# Patient Record
Sex: FEMALE | Race: WHITE | NOT HISPANIC OR LATINO | Employment: FULL TIME | URBAN - METROPOLITAN AREA
[De-identification: names, ages, dates, MRNs, and addresses within clinical notes are randomized per-mention and may not be internally consistent; named-entity substitution may affect disease eponyms.]

---

## 2017-02-24 ENCOUNTER — HOSPITAL ENCOUNTER (OUTPATIENT)
Dept: MAMMOGRAPHY | Facility: HOSPITAL | Age: 52
Discharge: HOME | End: 2017-02-24
Attending: OBSTETRICS & GYNECOLOGY | Admitting: OBSTETRICS & GYNECOLOGY

## 2018-04-17 ENCOUNTER — HOSPITAL ENCOUNTER (OUTPATIENT)
Dept: MAMMOGRAPHY | Facility: HOSPITAL | Age: 53
Discharge: HOME | End: 2018-04-17
Attending: OBSTETRICS & GYNECOLOGY | Admitting: OBSTETRICS & GYNECOLOGY

## 2018-05-16 ENCOUNTER — HOSPITAL ENCOUNTER (OUTPATIENT)
Dept: MAMMOGRAPHY | Facility: HOSPITAL | Age: 53
Discharge: HOME | End: 2018-05-16
Attending: OBSTETRICS & GYNECOLOGY | Admitting: OBSTETRICS & GYNECOLOGY

## 2019-04-25 ENCOUNTER — HOSPITAL ENCOUNTER (OUTPATIENT)
Dept: MAMMOGRAPHY | Facility: HOSPITAL | Age: 54
Discharge: HOME | End: 2019-04-25
Attending: OBSTETRICS & GYNECOLOGY | Admitting: OBSTETRICS & GYNECOLOGY

## 2019-11-06 ENCOUNTER — PATIENT MESSAGE (OUTPATIENT)
Dept: PODIATRY | Facility: CLINIC | Age: 54
End: 2019-11-06

## 2020-10-23 PROCEDURE — 87077 CULTURE AEROBIC IDENTIFY: CPT

## 2020-10-23 PROCEDURE — 87086 URINE CULTURE/COLONY COUNT: CPT

## 2020-10-23 PROCEDURE — 87186 SC STD MICRODIL/AGAR DIL: CPT

## 2021-03-31 PROBLEM — M54.50 LOW BACK PAIN: Status: ACTIVE | Noted: 2017-02-19

## 2021-03-31 PROBLEM — M19.90 OSTEOARTHRITIS: Status: ACTIVE | Noted: 2021-01-01

## 2021-03-31 PROBLEM — N39.0 UTI (URINARY TRACT INFECTION): Status: ACTIVE | Noted: 2018-11-14

## 2021-03-31 PROBLEM — Z83.3 FAMILY HISTORY OF DIABETES MELLITUS: Status: ACTIVE | Noted: 2021-03-31

## 2021-03-31 PROCEDURE — 87086 URINE CULTURE/COLONY COUNT: CPT | Performed by: FAMILY MEDICINE

## 2021-03-31 PROCEDURE — 87077 CULTURE AEROBIC IDENTIFY: CPT | Performed by: FAMILY MEDICINE

## 2021-03-31 PROCEDURE — 87186 SC STD MICRODIL/AGAR DIL: CPT | Performed by: FAMILY MEDICINE

## 2023-01-19 ENCOUNTER — TRANSCRIBE ORDERS (OUTPATIENT)
Dept: ADMINISTRATIVE | Facility: HOSPITAL | Age: 58
End: 2023-01-19
Payer: COMMERCIAL

## 2023-01-19 DIAGNOSIS — Z13.6 SCREENING FOR CARDIOVASCULAR CONDITION: Primary | ICD-10-CM

## 2023-04-28 ENCOUNTER — HOSPITAL ENCOUNTER (OUTPATIENT)
Dept: CARDIOLOGY | Facility: HOSPITAL | Age: 58
Discharge: HOME | End: 2023-04-28

## 2023-04-28 ENCOUNTER — HOSPITAL ENCOUNTER (OUTPATIENT)
Dept: CT IMAGING | Facility: HOSPITAL | Age: 58
Discharge: HOME | End: 2023-04-28

## 2023-04-28 DIAGNOSIS — Z13.6 SCREENING FOR CARDIOVASCULAR CONDITION: ICD-10-CM

## 2023-04-28 LAB
BH CV XLRA MEAS - PAD LEFT ABI PT: 1.15
BH CV XLRA MEAS - PAD LEFT ARM: 142 MMHG
BH CV XLRA MEAS - PAD LEFT LEG PT: 181 MMHG
BH CV XLRA MEAS - PAD RIGHT ABI PT: 1.13
BH CV XLRA MEAS - PAD RIGHT ARM: 157 MMHG
BH CV XLRA MEAS - PAD RIGHT LEG PT: 177 MMHG
BH CV XLRA MEAS - PROX AO DIAM: 1.7 CM
BH CV XLRA MEAS LEFT DIST CCA EDV: -26.3 CM/SEC
BH CV XLRA MEAS LEFT DIST CCA PSV: -83.4 CM/SEC
BH CV XLRA MEAS LEFT ICA/CCA RATIO: 1.1
BH CV XLRA MEAS LEFT MID CCA PSV: 83 CM/SEC
BH CV XLRA MEAS LEFT MID ICA PSV: 89 CM/SEC
BH CV XLRA MEAS LEFT PROX ICA EDV: -27.4 CM/SEC
BH CV XLRA MEAS LEFT PROX ICA PSV: -88.9 CM/SEC
BH CV XLRA MEAS RIGHT DIST CCA EDV: -21.4 CM/SEC
BH CV XLRA MEAS RIGHT DIST CCA PSV: -76.3 CM/SEC
BH CV XLRA MEAS RIGHT ICA/CCA RATIO: 1.1
BH CV XLRA MEAS RIGHT MID CCA PSV: 76 CM/SEC
BH CV XLRA MEAS RIGHT MID ICA PSV: 82 CM/SEC
BH CV XLRA MEAS RIGHT PROX ICA EDV: -31.8 CM/SEC
BH CV XLRA MEAS RIGHT PROX ICA PSV: -82.3 CM/SEC
MAXIMAL PREDICTED HEART RATE: 161 BPM
STRESS TARGET HR: 137 BPM

## 2023-04-28 PROCEDURE — 93799 UNLISTED CV SVC/PROCEDURE: CPT

## 2023-04-28 PROCEDURE — 75571 CT HRT W/O DYE W/CA TEST: CPT

## 2024-01-15 ENCOUNTER — TRANSCRIBE ORDERS (OUTPATIENT)
Dept: ADMINISTRATIVE | Facility: HOSPITAL | Age: 59
End: 2024-01-15
Payer: COMMERCIAL

## 2024-01-15 DIAGNOSIS — M81.0 AGE RELATED OSTEOPOROSIS, UNSPECIFIED PATHOLOGICAL FRACTURE PRESENCE: Primary | ICD-10-CM

## 2024-07-23 ENCOUNTER — TRANSCRIBE ORDERS (OUTPATIENT)
Dept: ADMINISTRATIVE | Facility: HOSPITAL | Age: 59
End: 2024-07-23
Payer: COMMERCIAL

## 2024-07-23 DIAGNOSIS — Z12.31 VISIT FOR SCREENING MAMMOGRAM: Primary | ICD-10-CM

## 2024-08-09 ENCOUNTER — HOSPITAL ENCOUNTER (OUTPATIENT)
Dept: MAMMOGRAPHY | Facility: HOSPITAL | Age: 59
Discharge: HOME | End: 2024-08-09
Admitting: OBSTETRICS & GYNECOLOGY
Payer: OTHER GOVERNMENT

## 2024-08-09 DIAGNOSIS — Z12.31 VISIT FOR SCREENING MAMMOGRAM: ICD-10-CM

## 2024-08-09 PROCEDURE — 77067 SCR MAMMO BI INCL CAD: CPT

## 2024-08-09 PROCEDURE — 77063 BREAST TOMOSYNTHESIS BI: CPT

## 2024-09-19 ENCOUNTER — E-VISIT (OUTPATIENT)
Dept: FAMILY MEDICINE CLINIC | Facility: TELEHEALTH | Age: 59
End: 2024-09-19

## 2024-12-16 ENCOUNTER — HOSPITAL ENCOUNTER (OUTPATIENT)
Dept: BONE DENSITY | Facility: HOSPITAL | Age: 59
Discharge: HOME | End: 2024-12-16
Admitting: NURSE PRACTITIONER
Payer: OTHER GOVERNMENT

## 2024-12-16 DIAGNOSIS — M81.0 AGE RELATED OSTEOPOROSIS, UNSPECIFIED PATHOLOGICAL FRACTURE PRESENCE: ICD-10-CM

## 2024-12-16 PROCEDURE — 77080 DXA BONE DENSITY AXIAL: CPT

## 2025-07-06 ENCOUNTER — E-VISIT (OUTPATIENT)
Dept: ADMINISTRATIVE | Facility: OTHER | Age: 60
End: 2025-07-06
Payer: OTHER GOVERNMENT

## 2025-07-06 NOTE — E-VISIT ESCALATED
Status: Referred Out  Date: 2025 16:52:51  Acuity Level: Within 3 days  Referral message:  We're sorry you are not feeling well. Your safety is important to us. Sudden back pain in those over the age of 50 could indicate a problem with your spine. Lab tests and imaging may be required to rule out a more serious condition and   find the most effective treatment for you. For this reason, we would like for you to be seen in person.   For the most appropriate care, please be seen:   At a clinic or an urgent care  Within 3 days   You won't be charged for this visit.We hope you   feel better soon!   Patient: Angelica Flood  Patient : [id number]  Patient Address: [address] [place] [address] [place], [place] [address]  Patient Phone: [phone]  Clinician Response: Unavailable  Diagnosis: Unavailable  Diagnosis ICD: Unavailable     Patient Interview Questions and Responses:  Clinical Protocol: Low back pain  Please select the reason for your visit today.: Low back pain  When did your back pain begin?: 5-12 weeks ago  Did your back pain begin suddenly?: Yes  Where is your back pain located? Select all that apply. Buttocks: No  Where is your back pain located? Select all that apply. Low back area: Yes  Where is your back pain located? Select all that apply. Mid back area: No  Where is your back pain located? Select all that apply. Upper back area: No  Where is your back pain located? Select all that apply. Neck: No  Some conditions are limited to one side of the body. This information will help identify a possible cause of your pain.Which side is the pain on?: Left side  Please describe your back pain. Constant - it is the same all the time: No  Please describe your back pain. It varies depending on activity: Yes  Please describe your back pain. It goes away when resting: Yes  Please describe your back pain. Can feel back muscles spasm at times: Yes  Does pain decrease when bending forward?: Yes  Please rate the  severity of your back pain on a pain scale, with 0 being no pain and 10 being the worst pain imaginable.: 7-9 = Severe Pain (difficult to perform normal daily activities)